# Patient Record
Sex: FEMALE | Race: WHITE | URBAN - METROPOLITAN AREA
[De-identification: names, ages, dates, MRNs, and addresses within clinical notes are randomized per-mention and may not be internally consistent; named-entity substitution may affect disease eponyms.]

---

## 2019-08-07 ENCOUNTER — OFFICE VISIT (OUTPATIENT)
Dept: PRIMARY CARE CLINIC | Age: 34
End: 2019-08-07

## 2019-08-07 VITALS
TEMPERATURE: 98.4 F | HEART RATE: 65 BPM | WEIGHT: 186 LBS | HEIGHT: 68 IN | RESPIRATION RATE: 16 BRPM | SYSTOLIC BLOOD PRESSURE: 136 MMHG | BODY MASS INDEX: 28.19 KG/M2 | OXYGEN SATURATION: 100 % | DIASTOLIC BLOOD PRESSURE: 86 MMHG

## 2019-08-07 DIAGNOSIS — F41.9 ANXIETY: Primary | ICD-10-CM

## 2019-08-07 DIAGNOSIS — E66.3 OVERWEIGHT (BMI 25.0-29.9): ICD-10-CM

## 2019-08-07 PROBLEM — E66.01 OBESITY, MORBID (HCC): Status: RESOLVED | Noted: 2019-08-07 | Resolved: 2019-08-07

## 2019-08-07 PROBLEM — E66.01 OBESITY, MORBID (HCC): Status: ACTIVE | Noted: 2019-08-07

## 2019-08-07 RX ORDER — DIETHYLPROPION HYDROCHLORIDE 25 MG/1
TABLET ORAL
Qty: 30 TAB | Refills: 0 | Status: SHIPPED | OUTPATIENT
Start: 2019-08-07 | End: 2019-09-03 | Stop reason: ALTCHOICE

## 2019-08-07 RX ORDER — NORGESTIMATE AND ETHINYL ESTRADIOL 7DAYSX3 28
KIT ORAL
COMMUNITY

## 2019-08-07 RX ORDER — SERTRALINE HYDROCHLORIDE 50 MG/1
50 TABLET, FILM COATED ORAL DAILY
Qty: 90 TAB | Refills: 1 | Status: SHIPPED | OUTPATIENT
Start: 2019-08-07 | End: 2019-11-05

## 2019-08-07 RX ORDER — SERTRALINE HYDROCHLORIDE 50 MG/1
50 TABLET, FILM COATED ORAL DAILY
COMMUNITY
Start: 2019-08-07 | End: 2019-08-07 | Stop reason: SDUPTHER

## 2019-08-07 NOTE — PROGRESS NOTES
Written by Naseem Talbert, as dictated by Dr. Graciela Gaytan MD.    Jose Finn is a 34 y.o. female. HPI  The patient comes in today to establish care. She denies a hx of asthma, anemia, glaucoma. Pt would like to discuss weight loss, specifically on phentermine, which her coworkers have been taking with mixed results. Pt weighs 186 lbs today. Pt reports that she has never tried medication. She has been exercising regularly since 11/2018. Pt reports that she enjoys going to the gym and taking classes, such as cycling 3-4 times a week, and reports to be a morning person. She denies depression or dysthymia. She reports that she usually walks 10,000 steps daily if she is exercising, but otherwise, she has 5,000 steps. She reports 10 lbs of weight loss on a keto diet for 1 month, 2 years ago, but feels it was not sustainable. She has not tried weight watchers or any similar programs. Pt reports that she does have cravings. Pt states that she is pretty good when it comes to calories, especially during the week. She admits that she has difficulty on the weekends with eating things like beer, and burgers. Pt is taking Zoloft 50 mg for anxiety, which she has been taking for 4 years. She reports that her anxiety has improved from when she was younger. She does not believe that it is enabling. Pt reports that her menstrual cycle is normal and she is currently sexually active. She has not had a pregnancy. Pt reports drinking a glass of wine 3-4 times a week. Patient Active Problem List   Diagnosis Code    Overweight (BMI 25.0-29. 9) E66.3        Current Outpatient Medications on File Prior to Visit   Medication Sig Dispense Refill    sertraline (ZOLOFT) 50 mg tablet Take 50 mg by mouth daily.  norgestimate-ethinyl estradiol (TRI-PREVIFEM, 28,) 0.18/0.215/0.25 mg-35 mcg (28) tab Take  by mouth.        No current facility-administered medications on file prior to visit. Allergies   Allergen Reactions    Pcn [Penicillins] Swelling       Past Medical History:   Diagnosis Date    Anxiety        History reviewed. No pertinent surgical history. Family History   Problem Relation Age of Onset    Breast Cancer Mother     Diabetes Father    [place] Lupus Sister     No Known Problems Brother     Breast Cancer Maternal Aunt     No Known Problems Maternal Grandmother     Cancer Maternal Grandfather     No Known Problems Paternal Grandmother     No Known Problems Paternal Grandfather     No Known Problems Sister        Social History     Socioeconomic History    Marital status:      Spouse name: Not on file    Number of children: Not on file    Years of education: Not on file    Highest education level: Not on file   Occupational History    Not on file   Social Needs    Financial resource strain: Not on file    Food insecurity:     Worry: Not on file     Inability: Not on file    Transportation needs:     Medical: Not on file     Non-medical: Not on file   Tobacco Use    Smoking status: Never Smoker    Smokeless tobacco: Never Used   Substance and Sexual Activity    Alcohol use:  Yes     Alcohol/week: 2.0 standard drinks     Types: 2 Glasses of wine per week    Drug use: Never    Sexual activity: Yes     Partners: Male     Birth control/protection: Pill   Lifestyle    Physical activity:     Days per week: Not on file     Minutes per session: Not on file    Stress: Not on file   Relationships    Social connections:     Talks on phone: Not on file     Gets together: Not on file     Attends Anabaptist service: Not on file     Active member of club or organization: Not on file     Attends meetings of clubs or organizations: Not on file     Relationship status: Not on file    Intimate partner violence:     Fear of current or ex partner: Not on file     Emotionally abused: Not on file     Physically abused: Not on file     Forced sexual activity: Not on file   Other Topics Concern    Not on file   Social History Narrative    Not on file       Review of Systems   Constitutional: Negative for malaise/fatigue. HENT: Negative for congestion. Eyes: Negative for blurred vision and pain. Respiratory: Negative for cough and shortness of breath. Cardiovascular: Negative for chest pain and palpitations. Gastrointestinal: Negative for abdominal pain and heartburn. Genitourinary: Negative for frequency and urgency. Musculoskeletal: Negative for joint pain and myalgias. Neurological: Negative for dizziness, tingling, sensory change, weakness and headaches. Psychiatric/Behavioral: Negative for depression, memory loss and substance abuse. Visit Vitals  /86 (BP 1 Location: Left arm, BP Patient Position: Sitting)   Pulse 65   Temp 98.4 °F (36.9 °C) (Oral)   Resp 16   Ht 5' 8\" (1.727 m)   Wt 186 lb (84.4 kg)   LMP 08/03/2019   SpO2 100%   BMI 28.28 kg/m²       Physical Exam   Constitutional: She is oriented to person, place, and time. She appears well-developed and well-nourished. No distress. HENT:   Right Ear: External ear normal.   Left Ear: External ear normal.   Eyes: Conjunctivae and EOM are normal. Right eye exhibits no discharge. Left eye exhibits no discharge. Neck: Normal range of motion. Neck supple. Cardiovascular: Normal rate, regular rhythm and normal heart sounds. Pulmonary/Chest: Effort normal and breath sounds normal. She has no wheezes. Abdominal: Soft. Bowel sounds are normal. There is no tenderness. Lymphadenopathy:     She has no cervical adenopathy. Neurological: She is alert and oriented to person, place, and time. Skin: She is not diaphoretic. Psychiatric: She has a normal mood and affect. Her behavior is normal.   Nursing note and vitals reviewed. ASSESSMENT and PLAN    ICD-10-CM ICD-9-CM    1. Anxiety F41.9 300.00 Well managed on Zoloft 50 mg. No changes necessary at this time.    2. Overweight (BMI 25.0-29. 9) E66.3 278.02 diethylpropion 25 mg tab script given to pt. Diethylpropion 25 mg prescribed. Potential side effects were discussed. Pt can take BID on weekends, for her larger meals. Pt will follow-up in 1 month. Pt should let me know if she has any palpitations. An EKG will be performed at follow-up. Pt was advised to limit her calorie intake to 1300 daily and 1500 calories when she was is active. Pt was not prescribed phentermine because of her BMI. This plan was reviewed with the patient and patient agrees. All questions were answered. This scribe documentation was reviewed by me and accurately reflects the examination and decisions made by me. This note will not be viewable in [address]

## 2019-08-07 NOTE — PROGRESS NOTES
Visit Vitals  /86 (BP 1 Location: Left arm, BP Patient Position: Sitting)   Pulse 65   Temp 98.4 °F (36.9 °C) (Oral)   Resp 16   Ht 5' 8\" (1.727 m)   Wt 286 lb 6.4 oz (129.9 kg)   SpO2 100%   BMI 43.55 kg/m²           Chief Complaint   Patient presents with   [address]     Patient needs a new PCP; Previously seen at [address] [place] [address] [facility]     Would like to discuss            HM Due reviewed and WNL.         ====Leni Nunes Invitation====    Patient was invited to [facility] on this date and given the information folder for review. Recommended appointment with Leni Nunes facilitator for ACP conversation regarding advance directives. [x] Yes  [] No  Referral sent to [facility] team member or Coordinator for follow-up    [] Yes  [x] No  Patient scheduled an appointment. Site of Referral: [facility]             1. Have you been to the ER, urgent care clinic since your last visit? Hospitalized since your last visit? Urgent Care in July/Neck Pain     2. Have you seen or consulted any other health care providers outside of the [address] [place] [address] since your last visit? Include any pap smears or colon screening. OB/GYN and patient is new to [facility].

## 2019-08-21 DIAGNOSIS — F41.8 DEPRESSION WITH ANXIETY: Primary | ICD-10-CM

## 2019-08-21 RX ORDER — SERTRALINE HYDROCHLORIDE 50 MG/1
50 TABLET, FILM COATED ORAL DAILY
Qty: 90 TAB | Refills: 1 | Status: CANCELLED | OUTPATIENT
Start: 2019-08-21 | End: 2019-11-19

## 2019-09-03 ENCOUNTER — OFFICE VISIT (OUTPATIENT)
Dept: PRIMARY CARE CLINIC | Age: 34
End: 2019-09-03

## 2019-09-03 VITALS
HEIGHT: 68 IN | RESPIRATION RATE: 16 BRPM | DIASTOLIC BLOOD PRESSURE: 88 MMHG | OXYGEN SATURATION: 98 % | BODY MASS INDEX: 27.89 KG/M2 | SYSTOLIC BLOOD PRESSURE: 129 MMHG | WEIGHT: 184 LBS | TEMPERATURE: 98.3 F | HEART RATE: 74 BPM

## 2019-09-03 DIAGNOSIS — F41.9 ANXIETY: ICD-10-CM

## 2019-09-03 DIAGNOSIS — E66.3 OVERWEIGHT (BMI 25.0-29.9): Primary | ICD-10-CM

## 2019-09-03 RX ORDER — PHENTERMINE HYDROCHLORIDE 37.5 MG/1
37.5 TABLET ORAL
Qty: 14 TAB | Refills: 0 | Status: SHIPPED | OUTPATIENT
Start: 2019-09-03 | End: 2019-09-17

## 2019-09-03 NOTE — PROGRESS NOTES
Written by Maurilio Paris, as dictated by Dr. Mick Yancey MD.    Simone Black is a 34 y.o. female. HPI  The patient presents today for follow-up. She notes that she believes that Diethylproprion 25 mg has been working. She notes that she has more energy and more alert. She notes that in terms of diet suppression, it helps, but it is not strong. She notes that her weight at home has fluctuated. She notes that she goes to the gym in the mornings, and has been active in the afternoon, with walking her dogs. She walks about 5,000-7,000  steps daily. She denies palpitations. She would be happy to try phentermine. She notes having a little bit of problems initially with her sleep. She notes that she take Melatonin OTC to help her sleep. She reports Zoloft 50 mg is working well for her anxiety. She has not made an appointment for her physical exam yet. She receives flu shots annually. She reports her last tetanus shot in 2011. Patient Active Problem List   Diagnosis Code    Overweight (BMI 25.0-29. 9) E66.3        Current Outpatient Medications on File Prior to Visit   Medication Sig Dispense Refill    norgestimate-ethinyl estradiol (TRI-PREVIFEM, 28,) 0.18/0.215/0.25 mg-35 mcg (28) tab Take  by mouth.  sertraline (ZOLOFT) 50 mg tablet Take 1 Tab by mouth daily for 90 days. Indications: Anxiousness associated with Depression 90 Tab 1     No current facility-administered medications on file prior to visit.         Allergies   Allergen Reactions    Pcn [Penicillins] Swelling       Past Medical History:   Diagnosis Date    Anxiety        Family History   Problem Relation Age of Onset    Breast Cancer Mother     Diabetes Father    [facility] Lupus Sister     No Known Problems Brother     Breast Cancer Maternal Aunt     No Known Problems Maternal Grandmother     Cancer Maternal Grandfather     No Known Problems Paternal Grandmother     No Known Problems Paternal Grandfather     No Known Problems Sister        Social History     Socioeconomic History    Marital status:      Spouse name: Not on file    Number of children: Not on file    Years of education: Not on file    Highest education level: Not on file   Occupational History    Not on file   Social Needs    Financial resource strain: Not on file    Food insecurity:     Worry: Not on file     Inability: Not on file    Transportation needs:     Medical: Not on file     Non-medical: Not on file   Tobacco Use    Smoking status: Never Smoker    Smokeless tobacco: Never Used   Substance and Sexual Activity    Alcohol use: Yes     Alcohol/week: 2.0 standard drinks     Types: 2 Glasses of wine per week    Drug use: Never    Sexual activity: Yes     Partners: Male     Birth control/protection: Pill   Lifestyle    Physical activity:     Days per week: Not on file     Minutes per session: Not on file    Stress: Not on file   Relationships    Social connections:     Talks on phone: Not on file     Gets together: Not on file     Attends [employer] service: Not on file     Active member of club or organization: Not on file     Attends meetings of clubs or organizations: Not on file     Relationship status: Not on file    Intimate partner violence:     Fear of current or ex partner: Not on file     Emotionally abused: Not on file     Physically abused: Not on file     Forced sexual activity: Not on file   Other Topics Concern    Not on file   Social History Narrative    Not on file       No results found for any previous visit. Review of Systems   Respiratory: Negative for shortness of breath. Cardiovascular: Negative for chest pain, palpitations and leg swelling. Musculoskeletal: Negative for joint pain and myalgias. Neurological: Negative for dizziness and headaches. Psychiatric/Behavioral: Negative for depression and substance abuse.  The patient is not nervous/anxious and does not have insomnia. Visit Vitals  /88 (BP 1 Location: Left arm, BP Patient Position: Sitting)   Pulse 74   Temp 98.3 °F (36.8 °C) (Oral)   Resp 16   Ht 5' 8\" (1.727 m)   Wt 184 lb (83.5 kg)   LMP 08/16/2019   SpO2 98%   BMI 27.98 kg/m²       Physical Exam   Constitutional: She is oriented to person, place, and time. She appears well-developed and well-nourished. No distress. HENT:   Head: Normocephalic and atraumatic. Right Ear: External ear normal.   Left Ear: External ear normal.   Eyes: Conjunctivae are normal. Right eye exhibits no discharge. Left eye exhibits no discharge. Neck: Normal range of motion. Neck supple. Cardiovascular: Normal rate, regular rhythm and normal heart sounds. Exam reveals no gallop and no friction rub. No murmur heard. Pulmonary/Chest: Effort normal and breath sounds normal. She has no wheezes. Abdominal: Soft. Bowel sounds are normal. There is no tenderness. Musculoskeletal: Normal range of motion. Neurological: She is alert and oriented to person, place, and time. She has normal reflexes. Skin: She is not diaphoretic. Psychiatric: She has a normal mood and affect. Her behavior is normal. Thought content normal.   Nursing note and vitals reviewed. ASSESSMENT and PLAN    ICD-10-CM ICD-9-CM    1. Overweight (BMI 25.0-29. 9) E66.3 278.02 phentermine (ADIPEX-P) 37.5 mg tablet script given to pt. Phentermine 37.5 mg prescribed x 2 weeks. Potential side effects were discussed. Patient counseled about birth control and she should take extra precautions to prevent pregnancy on this medication. EKG will be taken on next follow-up. No history of glaucoma. Advised pt should lose at least 5 lbs in 2 weeks. Pt should start with 1/2 pill initially daily, and may increase to BID if without side effects, like palpitations. 2. Anxiety F41.9 300.00 Well-managed with Zoloft. Advised pt to receive flu shot in 10/2019.     This plan was reviewed with the patient and patient agrees. All questions were answered. This scribe documentation was reviewed by me and accurately reflects the examination and decisions made by me. This note will not be viewable in [address]

## 2019-09-03 NOTE — PROGRESS NOTES
Identified pt with two pt identifiers(name and ). Reviewed record in preparation for visit and have obtained necessary documentation. Chief Complaint   Patient presents with    Medication Evaluation     follow-up        Health Maintenance Due   Topic    DTaP/Tdap/Td series (1 - Tdap)    Influenza Age 5 to Adult         Visit Vitals  /88 (BP 1 Location: Left arm, BP Patient Position: Sitting)   Pulse 74   Temp 98.3 °F (36.8 °C) (Oral)   Resp 16   Ht 5' 8\" (1.727 m)   Wt 184 lb (83.5 kg)   LMP 2019   SpO2 98%   BMI 27.98 kg/m²     Pain Scale: 0 - No pain/10    Coordination of Care Questionnaire:  :   1. Have you been to the ER, urgent care clinic since your last visit? Hospitalized since your last visit? No    2. Have you seen or consulted any other health care providers outside of the [address] [place] [address] since your last visit? Include any pap smears or colon screening.  No

## 2019-09-17 ENCOUNTER — OFFICE VISIT (OUTPATIENT)
Dept: PRIMARY CARE CLINIC | Age: 34
End: 2019-09-17

## 2019-09-17 VITALS
HEIGHT: 68 IN | WEIGHT: 176.6 LBS | RESPIRATION RATE: 18 BRPM | DIASTOLIC BLOOD PRESSURE: 86 MMHG | TEMPERATURE: 97.9 F | HEART RATE: 74 BPM | OXYGEN SATURATION: 100 % | BODY MASS INDEX: 26.76 KG/M2 | SYSTOLIC BLOOD PRESSURE: 123 MMHG

## 2019-09-17 DIAGNOSIS — Z00.00 PHYSICAL EXAM: Primary | ICD-10-CM

## 2019-09-17 DIAGNOSIS — E66.3 OVERWEIGHT (BMI 25.0-29.9): ICD-10-CM

## 2019-09-17 DIAGNOSIS — E55.9 VITAMIN D DEFICIENCY: ICD-10-CM

## 2019-09-17 DIAGNOSIS — Z79.899 MEDICATION MANAGEMENT: ICD-10-CM

## 2019-09-17 RX ORDER — PHENTERMINE HYDROCHLORIDE 37.5 MG/1
37.5 TABLET ORAL
Qty: 30 TAB | Refills: 0 | Status: SHIPPED | OUTPATIENT
Start: 2019-09-17 | End: 2019-10-17

## 2019-09-17 NOTE — PROGRESS NOTES
Mer Liriano is a 30 y.o. female    Chief Complaint   Patient presents with    Complete Physical    Medication Evaluation     1. Have you been to the ER, urgent care clinic since your last visit? Hospitalized since your last visit? No    2. Have you seen or consulted any other health care providers outside of the [address] [place] [address] since your last visit? Include any pap smears or colon screening.  No       Visit Vitals  /86 (BP 1 Location: Left arm, BP Patient Position: Sitting)   Pulse 74   Temp 97.9 °F (36.6 °C) (Oral)   Resp 18   Ht 5' 8\" (1.727 m)   Wt 176 lb 9.6 oz (80.1 kg)   SpO2 100%   BMI 26.85 kg/m²

## 2019-09-17 NOTE — PROGRESS NOTES
Written by Bernice Llamas, as dictated by Dr. Jayce Delacruz MD.    Yenni Stone is a 30 y.o. female. HPI  The patient comes in today for a complete physical examination and medication evaluation of phentermine. She weighs 176 lbs today and weighed 184 lbs on 09/03/19. She believes that she has settled into phentermine 37.5 mg. She started with 1/2 tab initially but noticed that she was getting hungry around lunch time. She averages around 1,000-1,500 calories daily. She is now taking 1 tab, which has been helpful with her cravings. She is no longer having trouble sleeping as compared to when she started phentermine. She reports lots of energy as a result. In terms of exercise, she does indoor cycling and averages around 5,000 steps daily. Denies sore throat, palpitations, constipation, and  symptoms. Denies hx of asthma as a child. She knows if she eats spicy food she will have heartburns. She goes to gynecology for her pap smear and breast examination. Patient Active Problem List   Diagnosis Code    Overweight (BMI 25.0-29. 9) E66.3        Current Outpatient Medications on File Prior to Visit   Medication Sig Dispense Refill    phentermine (ADIPEX-P) 37.5 mg tablet Take 1 Tab by mouth every morning for 14 doses. Max Daily Amount: 37.5 mg. 14 Tab 0    norgestimate-ethinyl estradiol (TRI-PREVIFEM, 28,) 0.18/0.215/0.25 mg-35 mcg (28) tab Take  by mouth.  sertraline (ZOLOFT) 50 mg tablet Take 1 Tab by mouth daily for 90 days. Indications: Anxiousness associated with Depression 90 Tab 1     No current facility-administered medications on file prior to visit.         Allergies   Allergen Reactions    Pcn [Penicillins] Swelling       Past Medical History:   Diagnosis Date    Anxiety        Family History   Problem Relation Age of Onset    Breast Cancer Mother     Diabetes Father    [facility] Lupus Sister     No Known Problems Brother     Breast Cancer Maternal Aunt  No Known Problems Maternal Grandmother     Cancer Maternal Grandfather     No Known Problems Paternal Grandmother     No Known Problems Paternal Grandfather     No Known Problems Sister        Social History     Socioeconomic History    Marital status:      Spouse name: Not on file    Number of children: Not on file    Years of education: Not on file    Highest education level: Not on file   Occupational History    Not on file   Social Needs    Financial resource strain: Not on file    Food insecurity:     Worry: Not on file     Inability: Not on file    Transportation needs:     Medical: Not on file     Non-medical: Not on file   Tobacco Use    Smoking status: Never Smoker    Smokeless tobacco: Never Used   Substance and Sexual Activity    Alcohol use: Yes     Alcohol/week: 2.0 standard drinks     Types: 2 Glasses of wine per week    Drug use: Never    Sexual activity: Yes     Partners: Male     Birth control/protection: Pill   Lifestyle    Physical activity:     Days per week: Not on file     Minutes per session: Not on file    Stress: Not on file   Relationships    Social connections:     Talks on phone: Not on file     Gets together: Not on file     Attends Zoroastrian service: Not on file     Active member of club or organization: Not on file     Attends meetings of clubs or organizations: Not on file     Relationship status: Not on file    Intimate partner violence:     Fear of current or ex partner: Not on file     Emotionally abused: Not on file     Physically abused: Not on file     Forced sexual activity: Not on file   Other Topics Concern    Not on file   Social History Narrative    Not on file       No results found for any previous visit. Review of Systems   Constitutional: Negative for malaise/fatigue. HENT: Negative for congestion and sore throat. Eyes: Negative for blurred vision. Respiratory: Negative for shortness of breath.     Cardiovascular: Negative for chest pain and leg swelling. Gastrointestinal: Positive for heartburn (avoids spicy foods). Negative for constipation and diarrhea. Genitourinary: Negative for dysuria, frequency and urgency. Musculoskeletal: Negative for joint pain and myalgias. Neurological: Negative for dizziness and headaches. Psychiatric/Behavioral: Negative for depression and substance abuse. The patient is not nervous/anxious and does not have insomnia. Visit Vitals  /86 (BP 1 Location: Left arm, BP Patient Position: Sitting)   Pulse 74   Temp 97.9 °F (36.6 °C) (Oral)   Resp 18   Ht 5' 8\" (1.727 m)   Wt 176 lb 9.6 oz (80.1 kg)   LMP 08/13/2019   SpO2 100%   BMI 26.85 kg/m²       Physical Exam   Constitutional: She is oriented to person, place, and time. She appears well-developed and well-nourished. No distress. HENT:   Head: Normocephalic and atraumatic. Right Ear: Tympanic membrane, external ear and ear canal normal.   Left Ear: Tympanic membrane, external ear and ear canal normal.   Nose: Right sinus exhibits no maxillary sinus tenderness. Left sinus exhibits no maxillary sinus tenderness. Mouth/Throat: Oropharynx is clear and moist.   Eyes: Pupils are equal, round, and reactive to light. Conjunctivae and EOM are normal. Right eye exhibits no discharge. Left eye exhibits no discharge. Neck: Normal range of motion. Neck supple. No thyromegaly present. Cardiovascular: Normal rate, regular rhythm and normal heart sounds. Exam reveals no gallop and no friction rub. No murmur heard. Pulmonary/Chest: Effort normal and breath sounds normal. She has no wheezes. Abdominal: Soft. Bowel sounds are normal. There is no tenderness. Musculoskeletal: Normal range of motion. Lymphadenopathy:     She has no cervical adenopathy. Neurological: She is alert and oriented to person, place, and time. She has normal reflexes. Skin: She is not diaphoretic. Psychiatric: She has a normal mood and affect.  Her behavior is normal. Thought content normal.   Nursing note and vitals reviewed. ASSESSMENT and PLAN    ICD-10-CM ICD-9-CM    1. Physical exam K30.81 L19.9 METABOLIC PANEL, COMPREHENSIVE      CBC W/O DIFF      LIPID PANEL      TSH 3RD GENERATION    Complete physical exam done. Basic labs drawn. 2. Overweight (BMI 25.0-29. 9) E66.3 278.02 phentermine (ADIPEX-P) 37.5 mg tablet script given to pt. Phentermine 37.5 mg prescribed x 1 month. Potential side effects were discussed. Patient counseled about birth control and she should take extra precautions to prevent pregnancy on this medication. EKG showed sinus rhythm. No history of glaucoma. Advised pt to purchase a FitBit or similar device. Discussed that a healthy lifestyle requires 5,000-7,000 steps daily, but weight loss requires 10,000 steps daily. Patient should let me know if, after stopping phentermine, she struggles with weight maintenance as we may consider other options. 3. Vitamin D deficiency E55.9 268.9 VITAMIN D, 25 HYDROXY    Vitamin D ordered. 4. Medication management Z79.899 V58.69 AMB POC EKG ROUTINE W/ 12 LEADS, INTER & REP    EKG in office today showed sinus rhythm. This plan was reviewed with the patient and patient agrees. All questions were answered. This scribe documentation was reviewed by me and accurately reflects the examination and decisions made by me. This note will not be viewable in [address]

## 2019-09-18 LAB
25(OH)D3+25(OH)D2 SERPL-MCNC: 46.6 NG/ML (ref 30–100)
ALBUMIN SERPL-MCNC: 4.6 G/DL (ref 3.5–5.5)
ALBUMIN/GLOB SERPL: 1.8 {RATIO} (ref 1.2–2.2)
ALP SERPL-CCNC: 55 IU/L (ref 39–117)
ALT SERPL-CCNC: 17 IU/L (ref 0–32)
AST SERPL-CCNC: 18 IU/L (ref 0–40)
BILIRUB SERPL-MCNC: 0.3 MG/DL (ref 0–1.2)
BUN SERPL-MCNC: 9 MG/DL (ref 6–20)
BUN/CREAT SERPL: 10 (ref 9–23)
CALCIUM SERPL-MCNC: 9.5 MG/DL (ref 8.7–10.2)
CHLORIDE SERPL-SCNC: 99 MMOL/L (ref 96–106)
CHOLEST SERPL-MCNC: 205 MG/DL (ref 100–199)
CO2 SERPL-SCNC: 24 MMOL/L (ref 20–29)
CREAT SERPL-MCNC: 0.9 MG/DL (ref 0.57–1)
ERYTHROCYTE [DISTWIDTH] IN BLOOD BY AUTOMATED COUNT: 12.7 % (ref 12.3–15.4)
GLOBULIN SER CALC-MCNC: 2.5 G/DL (ref 1.5–4.5)
GLUCOSE SERPL-MCNC: 91 MG/DL (ref 65–99)
HCT VFR BLD AUTO: 44.1 % (ref 34–46.6)
HDLC SERPL-MCNC: 58 MG/DL
HGB BLD-MCNC: 14.5 G/DL (ref 11.1–15.9)
LDLC SERPL CALC-MCNC: 133 MG/DL (ref 0–99)
MCH RBC QN AUTO: 28.3 PG (ref 26.6–33)
MCHC RBC AUTO-ENTMCNC: 32.9 G/DL (ref 31.5–35.7)
MCV RBC AUTO: 86 FL (ref 79–97)
PLATELET # BLD AUTO: 343 X10E3/UL (ref 150–450)
POTASSIUM SERPL-SCNC: 4 MMOL/L (ref 3.5–5.2)
PROT SERPL-MCNC: 7.1 G/DL (ref 6–8.5)
RBC # BLD AUTO: 5.13 X10E6/UL (ref 3.77–5.28)
SODIUM SERPL-SCNC: 138 MMOL/L (ref 134–144)
TRIGL SERPL-MCNC: 70 MG/DL (ref 0–149)
TSH SERPL DL<=0.005 MIU/L-ACNC: 0.83 UIU/ML (ref 0.45–4.5)
VLDLC SERPL CALC-MCNC: 14 MG/DL (ref 5–40)
WBC # BLD AUTO: 7.8 X10E3/UL (ref 3.4–10.8)

## 2019-09-19 NOTE — PROGRESS NOTES
Char, your blood report came back fine except cholesterol came high. I would recommend cardio exercise 3-4 times a week &  Weight loss should help to bring the numbers down.

## 2020-02-07 DIAGNOSIS — F32.89 OTHER DEPRESSION: Primary | ICD-10-CM

## 2020-02-07 RX ORDER — SERTRALINE HYDROCHLORIDE 50 MG/1
50 TABLET, FILM COATED ORAL DAILY
Qty: 30 TAB | Refills: 2 | Status: SHIPPED | OUTPATIENT
Start: 2020-02-07 | End: 2020-04-01 | Stop reason: SDUPTHER

## 2020-04-01 DIAGNOSIS — F32.89 OTHER DEPRESSION: ICD-10-CM

## 2020-04-01 RX ORDER — SERTRALINE HYDROCHLORIDE 50 MG/1
50 TABLET, FILM COATED ORAL DAILY
Qty: 90 TAB | Refills: 0 | Status: SHIPPED | OUTPATIENT
Start: 2020-04-01 | End: 2020-06-30

## 2020-08-24 ENCOUNTER — E-VISIT (OUTPATIENT)
Dept: PRIMARY CARE CLINIC | Age: 35
End: 2020-08-24

## 2020-08-24 ENCOUNTER — VIRTUAL VISIT (OUTPATIENT)
Dept: PRIMARY CARE CLINIC | Age: 35
End: 2020-08-24
Payer: COMMERCIAL

## 2020-08-24 DIAGNOSIS — F32.A ANXIETY AND DEPRESSION: Primary | ICD-10-CM

## 2020-08-24 DIAGNOSIS — F41.9 ANXIETY AND DEPRESSION: Primary | ICD-10-CM

## 2020-08-24 DIAGNOSIS — E66.3 OVERWEIGHT: ICD-10-CM

## 2020-08-24 PROCEDURE — 99213 OFFICE O/P EST LOW 20 MIN: CPT | Performed by: INTERNAL MEDICINE

## 2020-08-24 NOTE — PROGRESS NOTES
Written by Edie Dasilva, as dictated by Dr. Greg Alexis MD.    Kellie Mercer is a 30 y.o. female. HPI  I was in the office while conducting this encounter. Consent:  She and/or her healthcare decision maker is aware that this patient-initiated Telehealth encounter is a billable service, with coverage as determined by her insurance carrier. She is aware that she may receive a bill and has provided verbal consent to proceed: Yes    This virtual visit was conducted via [employer]. Pursuant to the emergency declaration under the [facility] waiver authority and the [employer] Act, this Virtual  Visit was conducted to reduce the patient's risk of exposure to COVID-19 and provide continuity of care for an established patient. Services were provided through a video synchronous discussion virtually to substitute for in-person clinic visit. Due to this being a TeleHealth evaluation, many elements of the physical examination are unable to be assessed. Pt presents virtually today to discuss her Zoloft. She is planning to try to get pregnant, so she would like to discuss how to stop taking Zoloft. She is currently on 50 mg every day. She has been exercising a lot and has lost weight from 176 lb on 9/17/19 to 156 lb today. Patient Active Problem List   Diagnosis Code    Overweight (BMI 25.0-29. 9) [id number]        Current Outpatient Medications on File Prior to Visit   Medication Sig Dispense Refill    norgestimate-ethinyl estradiol (TRI-PREVIFEM, 28,) 0.18/0.215/0.25 mg-35 mcg (28) tab Take  by mouth. No current facility-administered medications on file prior to visit. Allergies   Allergen Reactions    Pcn [Penicillins] Swelling       Past Medical History:   Diagnosis Date    Anxiety        No past surgical history on file.     Family History   Problem Relation Age of Onset    Breast Cancer Mother     Diabetes Father    [place] Lupus Sister     No Known Problems Brother     Breast Cancer Maternal Aunt     No Known Problems Maternal Grandmother     Cancer Maternal Grandfather     No Known Problems Paternal Grandmother     No Known Problems Paternal Grandfather     No Known Problems Sister        Social History     Socioeconomic History    Marital status:      Spouse name: Not on file    Number of children: Not on file    Years of education: Not on file    Highest education level: Not on file   Occupational History    Not on file   Social Needs    Financial resource strain: Not on file    Food insecurity     Worry: Not on file     Inability: Not on file   [employer] needs     Medical: Not on file     Non-medical: Not on file   Tobacco Use    Smoking status: Never Smoker    Smokeless tobacco: Never Used   Substance and Sexual Activity    Alcohol use: Yes     Alcohol/week: 2.0 standard drinks     Types: 2 Glasses of wine per week    Drug use: Never    Sexual activity: Yes     Partners: Male     Birth control/protection: Pill   Lifestyle    Physical activity     Days per week: Not on file     Minutes per session: Not on file    Stress: Not on file   Relationships    Social connections     Talks on phone: Not on file     Gets together: Not on file     Attends [employer] service: Not on file     Active member of club or organization: Not on file     Attends meetings of clubs or organizations: Not on file     Relationship status: Not on file    Intimate partner violence     Fear of current or ex partner: Not on file     Emotionally abused: Not on file     Physically abused: Not on file     Forced sexual activity: Not on file   Other Topics Concern    Not on file   Social History Narrative    Not on file       No visits with results within 3 Month(s) from this visit.    Latest known visit with results is:   Office Visit on 09/17/2019   Component Date Value Ref Range Status    Glucose 09/17/2019 91  65 - 99 mg/dL Final    BUN 09/17/2019 9  6 - 20 mg/dL Final    Creatinine 09/17/2019 0.90  0.57 - 1.00 mg/dL Final    GFR est non-AA 09/17/2019 85  >59 mL/min/1.73 Final    GFR est AA 09/17/2019 98  >59 mL/min/1.73 Final    BUN/Creatinine ratio 09/17/2019 10  9 - 23 Final    Sodium 09/17/2019 138  134 - 144 mmol/L Final    Potassium 09/17/2019 4.0  3.5 - 5.2 mmol/L Final    Chloride 09/17/2019 99  96 - 106 mmol/L Final    CO2 09/17/2019 24  20 - 29 mmol/L Final    Calcium 09/17/2019 9.5  8.7 - 10.2 mg/dL Final    Protein, total 09/17/2019 7.1  6.0 - 8.5 g/dL Final    Albumin 09/17/2019 4.6  3.5 - 5.5 g/dL Final    GLOBULIN, TOTAL 09/17/2019 2.5  1.5 - 4.5 g/dL Final    A-G Ratio 09/17/2019 1.8  1.2 - 2.2 Final    Bilirubin, total 09/17/2019 0.3  0.0 - 1.2 mg/dL Final    Alk.  phosphatase 09/17/2019 55  39 - 117 IU/L Final    AST (SGOT) 09/17/2019 18  0 - 40 IU/L Final    ALT (SGPT) 09/17/2019 17  0 - 32 IU/L Final    WBC 09/17/2019 7.8  3.4 - 10.8 x10E3/uL Final    RBC 09/17/2019 5.13  3.77 - 5.28 x10E6/uL Final    HGB 09/17/2019 14.5  11.1 - 15.9 g/dL Final    HCT 09/17/2019 44.1  34.0 - 46.6 % Final    MCV 09/17/2019 86  79 - 97 fL Final    MCH 09/17/2019 28.3  26.6 - 33.0 pg Final    MCHC 09/17/2019 32.9  31.5 - 35.7 g/dL Final    RDW 09/17/2019 12.7  12.3 - 15.4 % Final    PLATELET 03/12/1044 835  150 - 450 x10E3/uL Final    Cholesterol, total 09/17/2019 205* 100 - 199 mg/dL Final    Triglyceride 09/17/2019 70  0 - 149 mg/dL Final    HDL Cholesterol 09/17/2019 58  >39 mg/dL Final    VLDL, calculated 09/17/2019 14  5 - 40 mg/dL Final    LDL, calculated 09/17/2019 133* 0 - 99 mg/dL Final    TSH 09/17/2019 0.835  0.450 - 4.500 uIU/mL Final    VITAMIN D, 25-HYDROXY 09/17/2019 46.6  30.0 - 100.0 ng/mL Final    Comment: Vitamin D deficiency has been defined by the [place] of  Medicine and an Endocrine Society practice guideline as a  level of serum 25-OH vitamin D less than 20 ng/mL (1,2). The Endocrine Society went on to further define vitamin D  insufficiency as a level between 21 and 29 ng/mL (2). 1. IOM ([place] of Medicine). 2010. Dietary reference     intakes for calcium and D. [facility]: [employer]. 2. Isi MF, Ele NC, Carola THOMPSON, et al.     Evaluation, treatment, and prevention of vitamin D     deficiency: an Endocrine Society clinical practice     guideline. JCEM. 2011 Jul; 96(7):1911-30. Review of Systems   Constitutional: Positive for weight loss (intentional). Negative for malaise/fatigue. HENT: Negative for congestion and sore throat. Eyes: Negative for blurred vision. Respiratory: Negative for cough and shortness of breath. Cardiovascular: Negative for chest pain and leg swelling. Gastrointestinal: Negative for abdominal pain, constipation and heartburn. Genitourinary: Negative for frequency and urgency. Musculoskeletal: Negative for back pain, joint pain and myalgias. Neurological: Negative for dizziness and headaches. Psychiatric/Behavioral: Negative for depression. The patient is not nervous/anxious and does not have insomnia. There were no vitals taken for this visit. Physical Exam  Nursing note reviewed. Constitutional:       Appearance: Normal appearance. She is well-developed and well-groomed. HENT:      Head: Normocephalic and atraumatic. Nose: No congestion. Eyes:      General:         Right eye: No discharge. Left eye: No discharge. Pulmonary:      Effort: Pulmonary effort is normal.      Breath sounds: No wheezing. Neurological:      Mental Status: She is alert and oriented to person, place, and time. Psychiatric:         Mood and Affect: Mood normal.         Behavior: Behavior normal.       ASSESSMENT and PLAN    ICD-10-CM ICD-9-CM    1.  Anxiety and depression  F41.9 300.00 I instructed her to take a half tablet of Zoloft every day for a week. After that, she should taken a half tablet every other day for a week. In the third week, she should only take one half tablet 3x per week. If she begins feeling anxious the third week, she can decrease to taking it 2x the fourth week. If she is feeling fine the third week, she can d/c it after that. If this taper does not work for her and she becomes anxious, then she should contact me. However, if it goes well, she does not need to see me and can f/u with her OBGYN.       F32.9 311    2. Overweight  E66.3 278.02 She has lost a lot of weight and is down to 156 lb now. I commended her on her efforts and success. This plan was reviewed with the patient and patient agrees. All questions were answered. This scribe documentation was reviewed by me and accurately reflects the examination and decisions made by me. This note will not be viewable in [address]

## 2020-08-29 DIAGNOSIS — F32.89 OTHER DEPRESSION: ICD-10-CM

## 2020-08-29 RX ORDER — SERTRALINE HYDROCHLORIDE 50 MG/1
TABLET, FILM COATED ORAL
Qty: 30 TAB | Refills: 2 | Status: SHIPPED | OUTPATIENT
Start: 2020-08-29 | End: 2020-08-30

## 2020-08-30 RX ORDER — SERTRALINE HYDROCHLORIDE 50 MG/1
TABLET, FILM COATED ORAL
Qty: 90 TAB | Refills: 0 | Status: SHIPPED | OUTPATIENT
Start: 2020-08-30